# Patient Record
Sex: FEMALE | Race: ASIAN | Employment: UNEMPLOYED | URBAN - METROPOLITAN AREA
[De-identification: names, ages, dates, MRNs, and addresses within clinical notes are randomized per-mention and may not be internally consistent; named-entity substitution may affect disease eponyms.]

---

## 2022-08-17 ENCOUNTER — APPOINTMENT (OUTPATIENT)
Dept: GENERAL RADIOLOGY | Age: 62
End: 2022-08-17
Payer: COMMERCIAL

## 2022-08-17 ENCOUNTER — HOSPITAL ENCOUNTER (EMERGENCY)
Age: 62
Discharge: HOME | End: 2022-08-18
Attending: EMERGENCY MEDICINE
Payer: COMMERCIAL

## 2022-08-17 VITALS
TEMPERATURE: 98.5 F | DIASTOLIC BLOOD PRESSURE: 108 MMHG | HEART RATE: 82 BPM | SYSTOLIC BLOOD PRESSURE: 188 MMHG | OXYGEN SATURATION: 100 % | BODY MASS INDEX: 24.89 KG/M2 | HEIGHT: 64 IN | RESPIRATION RATE: 18 BRPM

## 2022-08-17 DIAGNOSIS — S62.101A CLOSED FRACTURE OF RIGHT WRIST, INITIAL ENCOUNTER: Primary | ICD-10-CM

## 2022-08-17 PROCEDURE — 6370000000 HC RX 637 (ALT 250 FOR IP): Performed by: EMERGENCY MEDICINE

## 2022-08-17 PROCEDURE — 73130 X-RAY EXAM OF HAND: CPT

## 2022-08-17 PROCEDURE — 73110 X-RAY EXAM OF WRIST: CPT

## 2022-08-17 PROCEDURE — 29125 APPL SHORT ARM SPLINT STATIC: CPT

## 2022-08-17 PROCEDURE — 99283 EMERGENCY DEPT VISIT LOW MDM: CPT

## 2022-08-17 RX ORDER — OXYCODONE HYDROCHLORIDE AND ACETAMINOPHEN 5; 325 MG/1; MG/1
2 TABLET ORAL ONCE
Status: COMPLETED | OUTPATIENT
Start: 2022-08-17 | End: 2022-08-17

## 2022-08-17 RX ORDER — LISINOPRIL 10 MG/1
10 TABLET ORAL ONCE
Status: COMPLETED | OUTPATIENT
Start: 2022-08-17 | End: 2022-08-17

## 2022-08-17 RX ADMIN — OXYCODONE AND ACETAMINOPHEN 2 TABLET: 5; 325 TABLET ORAL at 23:47

## 2022-08-17 RX ADMIN — LISINOPRIL 10 MG: 10 TABLET ORAL at 23:51

## 2022-08-17 ASSESSMENT — ENCOUNTER SYMPTOMS
VOMITING: 0
EYE DISCHARGE: 0
COUGH: 0
COLOR CHANGE: 0
SHORTNESS OF BREATH: 0
CONSTIPATION: 0
ABDOMINAL PAIN: 0
EYE ITCHING: 0

## 2022-08-18 ENCOUNTER — TELEPHONE (OUTPATIENT)
Dept: ORTHOPEDIC SURGERY | Age: 62
End: 2022-08-18

## 2022-08-18 ASSESSMENT — ENCOUNTER SYMPTOMS
ABDOMINAL PAIN: 0
SHORTNESS OF BREATH: 0
EYE ITCHING: 0
EYE DISCHARGE: 0
VOMITING: 0
COUGH: 0
CONSTIPATION: 0
COLOR CHANGE: 0

## 2022-08-18 NOTE — ED NOTES
Pt. Had episode of emesis. Pt. States that they think it was due to the percocet. States that she feels ok to go home. No signs of acute distress noted at this time.      Eva Oscar RN  08/18/22 [id number]

## 2022-08-18 NOTE — TELEPHONE ENCOUNTER
LVM. Upon return call, please offer appointment tomorrow in [facility] office @ 9:45 AM. May also schedule in available appointment times next week.

## 2022-08-18 NOTE — ED PROVIDER NOTES
[address] [facility]      Pt Name: Dea Lu  MRN: [id number]  [place] 1960  Date of evaluation: 8/17/2022  Provider: Cole Salguero MD    CHIEF COMPLAINT       Chief Complaint   Patient presents with    Fall     Tripped on ball and fell hurting right wrist       HISTORY OF PRESENT ILLNESS    Dea Lu is a 57 y.o. female who presents to the emergency department with wrist pain. Patient presents with right-sided wrist pain. Tripped on a ball and fell on her right wrist.  Outstretched hand. 9 out of 10 achy pain. Worse with movement. Better with rest.  Denies any other injuries or insults. Never happened before. No other associated symptoms. Nursing Notes were reviewed. Including nursing noted for FM, Surgical History, Past Medical History, Social History, vitals, and allergies; agree with all. REVIEW OF SYSTEMS       Review of Systems   Constitutional:  Negative for diaphoresis and unexpected weight change. HENT:  Negative for congestion and dental problem. Eyes:  Negative for discharge and itching. Respiratory:  Negative for cough and shortness of breath. Cardiovascular:  Negative for chest pain and leg swelling. Gastrointestinal:  Negative for abdominal pain, constipation and vomiting. Endocrine: Negative for cold intolerance and heat intolerance. Genitourinary:  Negative for vaginal bleeding, vaginal discharge and vaginal pain. Musculoskeletal:  Positive for arthralgias. Negative for neck pain and neck stiffness. Skin:  Negative for color change and pallor. Neurological:  Negative for tremors and weakness. Psychiatric/Behavioral:  Negative for agitation and behavioral problems. Except as noted above the remainder of the review of systems was reviewed and negative.      PAST MEDICAL HISTORY     Past Medical History:   Diagnosis Date    Hypertension     Thyroid disorder        SURGICAL HISTORY     History reviewed. No pertinent surgical history. CURRENT MEDICATIONS       Previous Medications    LEVOTHYROXINE SODIUM PO    Take 88 mcg by mouth. LISINOPRIL PO    Take  by mouth. MELOXICAM (MOBIC) 15 MG TABLET    1 po qd    PREDNISONE (DELTASONE) 20 MG TABLET    20mg 1 po bid x 5 days   10mg 1 po bid x 5 days   10mg 1 po qd x 5 days    TRAMADOL (ULTRAM) 50 MG TABLET    TAKE 1 TABLET BY MOUTH EVERY 6 HOURS AS NEEDED FOR PAIN FOR 10 DAYS       ALLERGIES     Iv dye [iodides]    FAMILY HISTORY      History reviewed. No pertinent family history. SOCIAL HISTORY       Social History     Socioeconomic History    Marital status:      Spouse name: None    Number of children: None    Years of education: None    Highest education level: None   Tobacco Use    Smoking status: Never    Smokeless tobacco: Never       PHYSICAL EXAM       ED Triage Vitals [08/17/22 2236]   BP Temp Temp Source Heart Rate Resp SpO2 Height Weight   (!) 224/159 98.5 °F (36.9 °C) Oral 82 18 100 % 5' 4\" (1.626 m) --       Physical Exam  Vitals and nursing note reviewed. Constitutional:       General: She is not in acute distress. Appearance: She is well-developed. She is not ill-appearing, toxic-appearing or diaphoretic. HENT:      Head: Normocephalic and atraumatic. Right Ear: External ear normal.      Left Ear: External ear normal.   Eyes:      General:         Right eye: No discharge. Left eye: No discharge. Conjunctiva/sclera: Conjunctivae normal.      Pupils: Pupils are equal, round, and reactive to light. Cardiovascular:      Rate and Rhythm: Normal rate and regular rhythm. Heart sounds: No murmur heard. Pulmonary:      Effort: Pulmonary effort is normal. No respiratory distress. Breath sounds: Normal breath sounds. No wheezing or rales. Abdominal:      General: Bowel sounds are normal. There is no distension. Palpations: Abdomen is soft. There is no mass.       Tenderness: There is no abdominal tenderness. There is no guarding or rebound. Genitourinary:     Comments: Deferred  Musculoskeletal:         General: Swelling, tenderness and signs of injury present. Cervical back: Normal range of motion and neck supple. Skin:     General: Skin is warm. Findings: No erythema or rash. Neurological:      Mental Status: She is alert and oriented to person, place, and time. She is not disoriented. Cranial Nerves: No cranial nerve deficit. Motor: No atrophy or abnormal muscle tone. Coordination: Coordination normal.   Psychiatric:         Behavior: Behavior normal.         Thought Content: Thought content normal.       DIAGNOSTIC RESULTS     RADIOLOGY:   Non-plain film images such as CT, Ultrasoundand MRI are read by the radiologist. Plain radiographic images are visualized and preliminarily interpreted by the emergency physician with the below findings:    FINDINGS:   Right wrist: There is a comminuted, mildly angulated fracture of the distal   radius with fracture lines extending into the radiocarpal joint space. There   is also a minimally displaced fracture of the ulnar styloid. No dislocation   is identified. There is diffusely decreased bone mineral density. Right hand: No acute fracture or dislocation of the hand is identified. There is diffusely decreased bone mineral density. ED BEDSIDE ULTRASOUND:   Performed by ED Physician - none    LABS:  Labs Reviewed - No data to display    All other labs were withinnormal range or not returned as of this dictation. EMERGENCY DEPARTMENT COURSE and DIFFERENTIAL DIAGNOSIS/MDM:     PMH, Surgical Hx, FH, Social Hx reviewed by myself (ETOH usage, Tobacco usage, Drug usage reviewed by myself, no pertinent Hx)- No Pertinent Hx     Old records were reviewed by me    79-year-old with right wrist fracture. As described above. Sugar-tong splint. Pain well controlled.   Close orthopedic follow-up outpatient. Neurovascular intact. Strict return precautions. I estimate there is LOW risk for Sepsis, MI, Stroke, Tamponade, PTX, Toxicity or other life threatening etiology thus I consider the discharge disposition reasonable. The patient is at low risk for mortality based on demographic, history and clinical factors. Given the best available information and clinical assessment, I estimate the risk of hospitalization to be greater than risk of treatment at home. I have explained to the patient that the risk could rapidly change, given precautions for return and instructions. Explained to patient that the risk for mortality is low based on demographic, history and clinical factors. I discussed with patient the results of evaluation in the ED, diagnosis, care, and prognosis. The plan is to discharge to home. Patient is in agreement with plan and questions have been answered. I also discussed with patient the reasons which may require a return visit and the importance of follow-up care. The patient is well-appearing, nontoxic, and improved at the time of discharge. Patient agrees to call to arrange follow-up care as directed. Patient understands to return immediately for worsening/change in symptoms. CRITICAL CARE TIME   Total Critical Caretime was 0 minutes, excluding separately reportable procedures. There was a high probability of clinically significant/life threatening deterioration in the patient's condition which required my urgent intervention. PROCEDURES:  Unlessotherwise noted below, none    FINAL IMPRESSION      1.  Closed fracture of right wrist, initial encounter          DISPOSITION/PLAN   DISPOSITION Decision To Discharge 08/17/2022 11:06:37 PM    PATIENT REFERRED TO:  Ariana Post MD  [address] [place] [address]  [phone]    Call today        DISCHARGE MEDICATIONS:  New Prescriptions    No medications on file          (Please note that portions ofthis note were completed with a voice recognition program.  Efforts were made to edit the dictations but occasionally words are mis-transcribed.)    Hilda Stahl MD(electronically signed)  Attending Emergency Physician          Hilda Stahl MD  08/18/22 [id number]

## 2022-08-18 NOTE — ED NOTES
D/C: Order noted for d/c. Pt confirmed d/c paperwork  have correct name. Discharge and education instructions reviewed with patient. Teach-back successful. Pt verbalized understanding and signed d/c papers. Pt denied questions at this time. No acute distress noted. Patient instructed to follow-up as noted - return to emergency department if symptoms worsen. Patient verbalized understanding. Discharged per EDMD with discharge instructions. Pt discharged to private vehicle. Patient stable upon departure. Thanked patient for choosing [facility]) for care.          Marcelo Dee RN  08/18/22 [id number]